# Patient Record
Sex: FEMALE | Race: WHITE | URBAN - METROPOLITAN AREA
[De-identification: names, ages, dates, MRNs, and addresses within clinical notes are randomized per-mention and may not be internally consistent; named-entity substitution may affect disease eponyms.]

---

## 2017-04-03 ENCOUNTER — TELEPHONE (OUTPATIENT)
Dept: FAMILY MEDICINE CLINIC | Facility: CLINIC | Age: 22
End: 2017-04-03

## 2017-04-03 RX ORDER — ALBUTEROL SULFATE 90 UG/1
2 AEROSOL, METERED RESPIRATORY (INHALATION) EVERY 4 HOURS PRN
Qty: 1 INHALER | Refills: 2 | Status: SHIPPED | OUTPATIENT
Start: 2017-04-03 | End: 2020-06-09

## 2017-04-03 NOTE — TELEPHONE ENCOUNTER
Refill approved and sent per protocol. Called Pt's mother and advised that refill sent, but that Pt will be due for Asthma check this summer. Mother verbalized understanding and agreed. Script sent.

## 2017-04-03 NOTE — TELEPHONE ENCOUNTER
Mother Darshana Hyde requesting refill on pt's Inhaler Albuterol ProAir thru [facility] ([place]. [place] at school) Was informed that we would sent to local and she would need to have it transferred

## 2017-06-01 PROBLEM — M75.101 ROTATOR CUFF SYNDROME OF RIGHT SHOULDER: Status: ACTIVE | Noted: 2017-06-01

## 2017-06-22 ENCOUNTER — OFFICE VISIT (OUTPATIENT)
Dept: FAMILY MEDICINE CLINIC | Facility: CLINIC | Age: 22
End: 2017-06-22

## 2017-06-22 VITALS
SYSTOLIC BLOOD PRESSURE: 110 MMHG | HEART RATE: 64 BPM | WEIGHT: 187 LBS | DIASTOLIC BLOOD PRESSURE: 60 MMHG | HEIGHT: 69 IN | BODY MASS INDEX: 27.7 KG/M2 | RESPIRATION RATE: 10 BRPM

## 2017-06-22 DIAGNOSIS — Z01.419 WELL WOMAN EXAM: Primary | ICD-10-CM

## 2017-06-22 PROCEDURE — 99395 PREV VISIT EST AGE 18-39: CPT | Performed by: FAMILY MEDICINE

## 2017-06-22 RX ORDER — LEVONORGESTREL AND ETHINYL ESTRADIOL 0.1-0.02MG
1 KIT ORAL DAILY
Qty: 3 PACKAGE | Refills: 3 | Status: SHIPPED | OUTPATIENT
Start: 2017-06-22 | End: 2018-06-28

## 2017-06-22 NOTE — PROGRESS NOTES
:HPI:   hSana Urbano is a 24year old female who presents for a complete physical exam.     Patient complains of bumps on chest.  Maybe from sun. Menses regular. Happy with OCP. Denies sexual activity.       Wt Readings from Last 3 Encounters:  06/ vision  HEENT: denies nasal congestion, sinus pain or ST, earache  LUNGS: denies shortness of breath with exertion  CARDIOVASCULAR: denies chest pain on exertion  GI: denies abdominal pain,denies heartburn  : denies dysuria, vaginal discharge or itching, 138 08/03/2016 09:09 AM   K 4.4 08/03/2016 09:09 AM    08/03/2016 09:09 AM   CO2 27.0 08/03/2016 09:09 AM           Lipids  (most recent labs)     Lab Results  Component Value Date/Time   CHOLEST 216* 11/23/2016 10:14 AM   TRIG 141* 11/23/2016 10:14

## 2017-07-12 PROBLEM — S43.431A SUPERIOR GLENOID LABRUM LESION OF RIGHT SHOULDER, INITIAL ENCOUNTER: Status: ACTIVE | Noted: 2017-07-12

## 2018-06-05 ENCOUNTER — OFFICE VISIT (OUTPATIENT)
Dept: FAMILY MEDICINE CLINIC | Facility: CLINIC | Age: 23
End: 2018-06-05

## 2018-06-05 VITALS
HEART RATE: 64 BPM | TEMPERATURE: 98 F | RESPIRATION RATE: 14 BRPM | SYSTOLIC BLOOD PRESSURE: 124 MMHG | WEIGHT: 193 LBS | DIASTOLIC BLOOD PRESSURE: 64 MMHG | BODY MASS INDEX: 28.91 KG/M2 | HEIGHT: 68.5 IN

## 2018-06-05 DIAGNOSIS — Z01.419 WELL WOMAN EXAM: Primary | ICD-10-CM

## 2018-06-05 DIAGNOSIS — Z23 NEED FOR VACCINATION: ICD-10-CM

## 2018-06-05 PROCEDURE — 90471 IMMUNIZATION ADMIN: CPT | Performed by: FAMILY MEDICINE

## 2018-06-05 PROCEDURE — 99395 PREV VISIT EST AGE 18-39: CPT | Performed by: FAMILY MEDICINE

## 2018-06-05 PROCEDURE — 90651 9VHPV VACCINE 2/3 DOSE IM: CPT | Performed by: FAMILY MEDICINE

## 2018-06-05 NOTE — PROGRESS NOTES
:HPI:   Kayden Zuñiga is a 25year old female who presents for a complete physical exam.     Patient would like HPV vaccination #1. She has never been sexually active. Would like to go off the pill and see how periods are off of it.     Wt Readings congestion, sinus pain or ST, earache  LUNGS: denies shortness of breath with exertion  CARDIOVASCULAR: denies chest pain on exertion  GI: denies abdominal pain,denies heartburn  : denies dysuria, vaginal discharge or itching,periods regular   MUSCULOSKE ALB 3.6 08/03/2016 09:09 AM    08/03/2016 09:09 AM   K 4.4 08/03/2016 09:09 AM    08/03/2016 09:09 AM   CO2 27.0 08/03/2016 09:09 AM           Lipids  (most recent labs)     Lab Results  Component Value Date/Time   CHOLEST 216 (H) 11/23/2016

## 2018-06-28 ENCOUNTER — TELEPHONE (OUTPATIENT)
Dept: FAMILY MEDICINE CLINIC | Facility: CLINIC | Age: 23
End: 2018-06-28

## 2018-06-28 RX ORDER — LEVONORGESTREL AND ETHINYL ESTRADIOL 0.1-0.02MG
1 KIT ORAL DAILY
Qty: 3 PACKAGE | Refills: 3 | Status: SHIPPED | OUTPATIENT
Start: 2018-06-28 | End: 2019-04-25

## 2018-06-28 NOTE — TELEPHONE ENCOUNTER
Please refill pt's birth control she is out of refills, saw Roly Walker on 6/5/18    [facility] is where she has it refilled.

## 2019-04-25 ENCOUNTER — OFFICE VISIT (OUTPATIENT)
Dept: FAMILY MEDICINE CLINIC | Facility: CLINIC | Age: 24
End: 2019-04-25
Payer: COMMERCIAL

## 2019-04-25 VITALS
WEIGHT: 195.63 LBS | TEMPERATURE: 99 F | BODY MASS INDEX: 29.31 KG/M2 | HEIGHT: 68.7 IN | SYSTOLIC BLOOD PRESSURE: 95 MMHG | HEART RATE: 85 BPM | RESPIRATION RATE: 18 BRPM | DIASTOLIC BLOOD PRESSURE: 60 MMHG

## 2019-04-25 DIAGNOSIS — B35.4 TINEA CORPORIS: ICD-10-CM

## 2019-04-25 DIAGNOSIS — J45.21 MILD INTERMITTENT ASTHMA WITH ACUTE EXACERBATION: Primary | ICD-10-CM

## 2019-04-25 DIAGNOSIS — Z23 NEED FOR VACCINATION: ICD-10-CM

## 2019-04-25 DIAGNOSIS — Z30.41 ENCOUNTER FOR BIRTH CONTROL PILLS MAINTENANCE: ICD-10-CM

## 2019-04-25 PROCEDURE — 90651 9VHPV VACCINE 2/3 DOSE IM: CPT | Performed by: FAMILY MEDICINE

## 2019-04-25 PROCEDURE — 99214 OFFICE O/P EST MOD 30 MIN: CPT | Performed by: FAMILY MEDICINE

## 2019-04-25 PROCEDURE — 90471 IMMUNIZATION ADMIN: CPT | Performed by: FAMILY MEDICINE

## 2019-04-25 RX ORDER — LEVONORGESTREL AND ETHINYL ESTRADIOL 0.1-0.02MG
1 KIT ORAL DAILY
Qty: 3 PACKAGE | Refills: 3 | Status: SHIPPED | OUTPATIENT
Start: 2019-04-25 | End: 2020-06-09

## 2019-04-25 RX ORDER — MONTELUKAST SODIUM 10 MG/1
1 TABLET ORAL DAILY
Refills: 3 | COMMUNITY
Start: 2019-03-25 | End: 2019-11-25 | Stop reason: ALTCHOICE

## 2019-04-25 NOTE — PROGRESS NOTES
Earl Montano is a 21year old female. S:  Patient presents today with the following concerns:  · Follow up OCP-menses light and pain free and very predictable. No breakthrough bleeding. · No issues with OCP. Likes it. · Rash in between breasts. bronchitis. CARDIOVASCULAR: denies chest pain on exertion  GI: denies abdominal pain.   No N/V/D/C  : denies dysuria  MUSCULOSKELETAL: denies back pain  NEURO: denies headaches    EXAM:  BP 95/60   Pulse 85   Temp 98.5 °F (36.9 °C)   Resp 18   Ht 68.7\" this summer for complete physical and pap. Will have 3rd HPV in the fall because will be in too soon to do it during physical.      Patient will follow up if symptoms worsen or do not improve.

## 2019-06-26 ENCOUNTER — OFFICE VISIT (OUTPATIENT)
Dept: FAMILY MEDICINE CLINIC | Facility: CLINIC | Age: 24
End: 2019-06-26
Payer: COMMERCIAL

## 2019-06-26 VITALS
DIASTOLIC BLOOD PRESSURE: 68 MMHG | HEIGHT: 68.5 IN | BODY MASS INDEX: 28.37 KG/M2 | RESPIRATION RATE: 20 BRPM | TEMPERATURE: 98 F | WEIGHT: 189.38 LBS | HEART RATE: 88 BPM | SYSTOLIC BLOOD PRESSURE: 104 MMHG

## 2019-06-26 DIAGNOSIS — R14.0 ABDOMINAL BLOATING: ICD-10-CM

## 2019-06-26 DIAGNOSIS — R63.0 DECREASED APPETITE: ICD-10-CM

## 2019-06-26 DIAGNOSIS — L81.9 HYPOPIGMENTED SKIN LESION: Primary | ICD-10-CM

## 2019-06-26 PROCEDURE — 99214 OFFICE O/P EST MOD 30 MIN: CPT | Performed by: FAMILY MEDICINE

## 2019-06-26 NOTE — PROGRESS NOTES
Amari Jensen is a 25year old female.     S:  Patient presents today with the following concerns:  Derm Problem (2 month f/u rash on upper chest. Chest area remains the same and has similar problem on posterior neck.)   Loss Of Appetite (present over the lung   • Arthritis Paternal Grandmother    • High Cholesterol Paternal Grandmother    • Hypertension Paternal Grandmother    • Asthma Sister    • Stroke Maternal Grandfather    • Diabetes Maternal Grandfather        REVIEW OF SYSTEMS:  GENERAL: feel or 2.    If menses is painful and this is continuing will get u/s of pelvis. Patient verbalizes understanding of treatment plan.

## 2019-11-25 ENCOUNTER — OFFICE VISIT (OUTPATIENT)
Dept: FAMILY MEDICINE CLINIC | Facility: CLINIC | Age: 24
End: 2019-11-25
Payer: COMMERCIAL

## 2019-11-25 VITALS
TEMPERATURE: 99 F | WEIGHT: 184.81 LBS | BODY MASS INDEX: 27.37 KG/M2 | HEART RATE: 60 BPM | SYSTOLIC BLOOD PRESSURE: 104 MMHG | DIASTOLIC BLOOD PRESSURE: 80 MMHG | RESPIRATION RATE: 16 BRPM | HEIGHT: 68.75 IN

## 2019-11-25 DIAGNOSIS — Z23 NEED FOR VACCINATION: ICD-10-CM

## 2019-11-25 DIAGNOSIS — Z00.00 WELL ADULT EXAM: Primary | ICD-10-CM

## 2019-11-25 PROCEDURE — 99395 PREV VISIT EST AGE 18-39: CPT | Performed by: FAMILY MEDICINE

## 2019-11-25 PROCEDURE — 90651 9VHPV VACCINE 2/3 DOSE IM: CPT | Performed by: FAMILY MEDICINE

## 2019-11-25 PROCEDURE — 90714 TD VACC NO PRESV 7 YRS+ IM: CPT | Performed by: FAMILY MEDICINE

## 2019-11-25 PROCEDURE — 90471 IMMUNIZATION ADMIN: CPT | Performed by: FAMILY MEDICINE

## 2019-11-25 PROCEDURE — 90472 IMMUNIZATION ADMIN EACH ADD: CPT | Performed by: FAMILY MEDICINE

## 2019-11-25 NOTE — PROGRESS NOTES
:HPI:   Apolinar Reed is a 25year old female who presents for a complete physical exam.     Patient complains of 1 week ago had abdominal swelling. Lasted 1 week. Was on menses. No pain. Has since resolved. Did not have urinary symptoms.   Did have rashes  EYES:denies blurred vision or double vision, changes in vision  HEENT: denies nasal congestion, sinus pain or ST, earache  LUNGS: denies shortness of breath with exertion  CARDIOVASCULAR: denies chest pain on exertion  GI: denies abdominal pain,den 10/28/2019    ALB 3.6 08/03/2016 09:09 AM     10/28/2019    K 4.7 10/28/2019     10/28/2019    CO2 20 10/28/2019           Lipids  (most recent labs)   Lab Results   Component Value Date/Time    CHOLEST 216 (H) 11/23/2016 10:14 AM    TRIG 141 (

## 2020-06-09 ENCOUNTER — TELEPHONE (OUTPATIENT)
Dept: FAMILY MEDICINE CLINIC | Facility: CLINIC | Age: 25
End: 2020-06-09

## 2020-06-09 DIAGNOSIS — J45.21 MILD INTERMITTENT ASTHMA WITH ACUTE EXACERBATION: Primary | ICD-10-CM

## 2020-06-09 DIAGNOSIS — Z30.41 ENCOUNTER FOR BIRTH CONTROL PILLS MAINTENANCE: ICD-10-CM

## 2020-06-09 RX ORDER — LEVONORGESTREL AND ETHINYL ESTRADIOL 0.1-0.02MG
1 KIT ORAL DAILY
Qty: 3 PACKAGE | Refills: 3 | Status: SHIPPED | OUTPATIENT
Start: 2020-06-09 | End: 2021-06-09

## 2020-06-09 RX ORDER — ALBUTEROL SULFATE 90 UG/1
2 AEROSOL, METERED RESPIRATORY (INHALATION) EVERY 4 HOURS PRN
Qty: 3 INHALER | Refills: 1 | Status: SHIPPED | OUTPATIENT
Start: 2020-06-09

## 2020-06-09 NOTE — TELEPHONE ENCOUNTER
Pt needs a refill on her  Albuterol Sulfate HFA (PROAIR HFA) 108 (90 Base) MCG/ACT Inhalation Aero Soln  And her birth control [facility]

## 2020-06-09 NOTE — TELEPHONE ENCOUNTER
LOV 11/25/2019. No future appointments. ACT 20. Medication refilled per protocol.      Requested Prescriptions     Signed Prescriptions Disp Refills   • Albuterol Sulfate HFA (PROAIR HFA) 108 (90 Base) MCG/ACT Inhalation Aero Soln 3 Inhaler 1     Sig:

## 2021-04-26 DIAGNOSIS — Z30.41 ENCOUNTER FOR BIRTH CONTROL PILLS MAINTENANCE: ICD-10-CM

## 2021-04-30 RX ORDER — LEVONORGESTREL AND ETHINYL ESTRADIOL 0.1-0.02MG
KIT ORAL
Qty: 84 TABLET | Refills: 3 | OUTPATIENT
Start: 2021-04-30

## 2021-04-30 NOTE — TELEPHONE ENCOUNTER
refil request for Levonogestrel  LOV 11/25/19  Last refill should be good till 6/2021  Current refill denied- needs appt  Please assist Pt in scheduling a physical appt

## (undated) NOTE — MR AVS SNAPSHOT
[facility] [place]  [place],  [address]  [address] [place] [id number] [phone]               Thank you for choosing us for your health care visit with [facility] DEENA Knowles.   We are glad to serve you and happy to provide you with this Commonly known as:  PROAIR HFA           Levonorgestrel-Ethinyl Estrad 0.1-20 MG-MCG Tabs   Take 1 tablet by mouth daily.    Commonly known as:  AVIANE                Where to Get Your Medications      These medications were sent to [address]